# Patient Record
Sex: MALE | Race: WHITE | NOT HISPANIC OR LATINO | Employment: UNEMPLOYED | URBAN - METROPOLITAN AREA
[De-identification: names, ages, dates, MRNs, and addresses within clinical notes are randomized per-mention and may not be internally consistent; named-entity substitution may affect disease eponyms.]

---

## 2018-08-23 ENCOUNTER — OFFICE VISIT (OUTPATIENT)
Dept: URGENT CARE | Facility: CLINIC | Age: 48
End: 2018-08-23
Payer: COMMERCIAL

## 2018-08-23 VITALS
HEIGHT: 72 IN | BODY MASS INDEX: 26.68 KG/M2 | HEART RATE: 68 BPM | RESPIRATION RATE: 18 BRPM | OXYGEN SATURATION: 97 % | SYSTOLIC BLOOD PRESSURE: 170 MMHG | TEMPERATURE: 97 F | WEIGHT: 197 LBS | DIASTOLIC BLOOD PRESSURE: 100 MMHG

## 2018-08-23 DIAGNOSIS — M25.421 SWELLING OF RIGHT ELBOW: Primary | ICD-10-CM

## 2018-08-23 DIAGNOSIS — F17.200 TOBACCO DEPENDENCE: ICD-10-CM

## 2018-08-23 PROCEDURE — 99203 OFFICE O/P NEW LOW 30 MIN: CPT | Mod: 25,S$GLB,, | Performed by: FAMILY MEDICINE

## 2018-08-23 PROCEDURE — 96372 THER/PROPH/DIAG INJ SC/IM: CPT | Mod: S$GLB,,, | Performed by: FAMILY MEDICINE

## 2018-08-23 RX ORDER — DEXTROAMPHETAMINE SACCHARATE, AMPHETAMINE ASPARTATE MONOHYDRATE, DEXTROAMPHETAMINE SULFATE AND AMPHETAMINE SULFATE 7.5; 7.5; 7.5; 7.5 MG/1; MG/1; MG/1; MG/1
30 CAPSULE, EXTENDED RELEASE ORAL EVERY MORNING
COMMUNITY
End: 2019-05-01

## 2018-08-23 RX ORDER — BETAMETHASONE SODIUM PHOSPHATE AND BETAMETHASONE ACETATE 3; 3 MG/ML; MG/ML
6 INJECTION, SUSPENSION INTRA-ARTICULAR; INTRALESIONAL; INTRAMUSCULAR; SOFT TISSUE
Status: COMPLETED | OUTPATIENT
Start: 2018-08-23 | End: 2018-08-23

## 2018-08-23 RX ORDER — OMEPRAZOLE 40 MG/1
40 CAPSULE, DELAYED RELEASE ORAL DAILY
COMMUNITY
End: 2019-05-01

## 2018-08-23 RX ORDER — OXYCODONE HYDROCHLORIDE 15 MG/1
10 TABLET ORAL EVERY 4 HOURS PRN
COMMUNITY
End: 2019-05-01

## 2018-08-23 RX ORDER — BUPROPION HYDROCHLORIDE 300 MG/1
300 TABLET ORAL DAILY
COMMUNITY
End: 2019-05-01

## 2018-08-23 RX ORDER — METHYLPREDNISOLONE 4 MG/1
4 TABLET ORAL DAILY
Qty: 1 PACKAGE | Refills: 0 | Status: SHIPPED | OUTPATIENT
Start: 2018-08-23 | End: 2018-08-29

## 2018-08-23 RX ORDER — NAPROXEN 500 MG/1
500 TABLET ORAL 2 TIMES DAILY WITH MEALS
Qty: 14 TABLET | Refills: 0 | Status: SHIPPED | OUTPATIENT
Start: 2018-08-23 | End: 2018-08-30

## 2018-08-23 RX ADMIN — BETAMETHASONE SODIUM PHOSPHATE AND BETAMETHASONE ACETATE 6 MG: 3; 3 INJECTION, SUSPENSION INTRA-ARTICULAR; INTRALESIONAL; INTRAMUSCULAR; SOFT TISSUE at 01:08

## 2018-08-23 NOTE — PATIENT INSTRUCTIONS
PLEASE READ YOUR DISCHARGE INSTRUCTIONS ENTIRELY AS IT CONTAINS IMPORTANT INFORMATION.        naproxen for pain. You should take an over the counter antacid (zantac, Nexium, Prilosec) to protect your stomach. Eat with this medication.      You received a steroid shot today - this can elevate your blood pressure, elevate your blood sugar, water weight gain, nervous energy, redness to the face and dimpling of the skin where the shot goes in.   Do not use steroids more than 3 times per year.   If you have diabetes, please check you blood sugar frequently.  If you have high blood pressure, please check your blood pressure frequently.      We will call you once the blood test results    Please follow up with your primary care doctor for further treatment if your symptoms do not improve.      If your symptoms worsen in the mean time (numbness to the area, inability to use your extremity, severely worsening pain or swelling, fever, blood in your stool or dark brown stool) please go to the emergency room for further treatment.       Treating Gout Attacks     Raising the joint above the level of your heart can help reduce gout symptoms.     Gout is a disease that affects the joints. It is caused by excess uric acid in your blood stream that may lead to crystals forming in your joints. Left untreated, it can lead to painful foot and joint deformities and even kidney problems. But, by treating gout early, you can relieve pain and help prevent future problems. Gout can usually be treated with medication and proper diet. In severe cases, surgery may be needed.  Gout attacks are painful and often happen more than once. Taking medications may reduce pain and prevent attacks in the future. There are also some things you can do at home to relieve symptoms.  Medications for gout  Your healthcare provider may prescribe a daily medication to reduce levels of uric acid. Reducing your uric acid levels may help prevent gout attacks.  Allopurinol is one commonly used medication taken daily to reduce uric acid levels. Other medications can help relieve pain and swelling during an acute attack. Medicines such as NSAIDs (nonsteroidal anti-inflammatory medicines), steroids, and colchicine may be prescribed for intermittent use to relieve an acute gout attack. Be sure to take your medication as directed.  What you can do  Below are some things you can do at home to relieve gout symptoms. Your healthcare provider may have other tips.  · Rest the painful joint as much as you can.  · Raise the painful joint so it is at a level higher than your heart.  · Use ice for 10 minutes every 1-2 hours as possible.  How can I prevent gout?  With a little effort, you may be able to prevent gout attacks in the future. Here are some things you can do:  · Avoid foods high in purines  ¨ Certain meats (red meat, processed meat, turkey)  ¨ Organ meats (kidney, liver, sweetbread)  ¨ Shellfish (lobster, crab, shrimp, scallop, mussel)  ¨ Certain fish (anchovy, sardine, herring, mackerel)  · Take any medications prescribed by your healthcare provider.  · Lose weight if you need to.  · Reduce high fructose corn syrup in meals and drinks.  · Reduce or eliminate consumption of alcohol, particularly beer, but also red wine and spirits.  · Control blood pressure, diabetes, and cholesterol.  · Drink plenty of water to help flush uric acid from your body.  Date Last Reviewed: 2/1/2016  © 9037-3006 [employer] [address] [place] [address], [place] [address]. All rights reserved. This information is not intended as a substitute for professional medical care. Always follow your healthcare professional's instructions.        What Is Gout?  Gout is a disease that affects the joints. Left untreated, it can lead to painful foot and joint deformities and even kidney problems. But, by treating gout early, you can relieve pain and help prevent future problems. Gout can usually be  "treated with medication and proper diet. In severe cases, surgery may be needed.  What causes gout?  Gout is caused by an excess of uric acid (a waste product made by the body). Uric acid is excreted by the kidneys. If the uric acid level in your blood rises too high, the uric acid may form crystals that collect in the joints, bringing on a gout attack. If you have many gout attacks, crystals may form large deposits called tophi. Tophi can damage joints and cause deformity.  Who is at risk for gout?  Men are more likely to have gout than women. But women can also be affected, mostly after menopause. Some health problems, such as obesity and high cholesterol, make gout more likely. And some medications, such as diuretics (water pills), can trigger a gout attack. People who drink a lot of alcohol are at high risk for gout. Certain foods can also trigger a gout attack.  Substances that may trigger a gout attack  To help prevent a gout attack, avoid these foods:  · Alcohol (particularly beer, but also red wine and spirits)  · Certain meats (red meat, processed meat, turkey)  · Organ meats (kidney, liver, sweetbread)  · Shellfish (lobster, crab, shrimp, scallop, mussel)  · Certain fish (anchovy, sardine, herring, mackerel)   Treatment  · Lifestyle changes, including weight loss, exercise, and quitting tobacco use  · Reducing consumption of the food groups above as well as high fructose corn syrup, found in many foods including sodas and energy drinks  · Changing non-essential medications that may contribute to gout (such as thiazide diuretics--"water pills")  · Medications to reduce the amount of uric acid in the blood, such as allopurinol or others  · Medications to treat acute gout attacks, including NSAIDs (nonsteroidal anti-inflammatory medicines), steroids, and colchicine  Date Last Reviewed: 2/1/2016  © 7076-8934 [employer]. [address] [place] [address], [place] [address]. All rights reserved. This " information is not intended as a substitute for professional medical care. Always follow your healthcare professional's instructions.

## 2018-08-24 ENCOUNTER — TELEPHONE (OUTPATIENT)
Dept: URGENT CARE | Facility: CLINIC | Age: 48
End: 2018-08-24

## 2018-08-24 LAB — URATE SERPL-MCNC: 4.6 MG/DL (ref 3.7–8.6)

## 2018-08-25 NOTE — TELEPHONE ENCOUNTER
----- Message from Mitzy Linda MD sent at 8/24/2018  9:19 AM CDT -----  Notify patient that all lab results are normal.No evidence of gout. Followup with primary care doctor as needed

## 2018-08-28 ENCOUNTER — TELEPHONE (OUTPATIENT)
Dept: URGENT CARE | Facility: CLINIC | Age: 48
End: 2018-08-28

## 2018-08-28 NOTE — TELEPHONE ENCOUNTER
delivered lab results to pt, he is going to follow up with primary doctor as instructed.     ----- Message from Mitzy Linda MD sent at 8/24/2018  9:19 AM CDT -----  Notify patient that all lab results are normal.No evidence of gout. Followup with primary care doctor as needed

## 2019-03-13 ENCOUNTER — OFFICE VISIT (OUTPATIENT)
Dept: URGENT CARE | Facility: CLINIC | Age: 49
End: 2019-03-13
Payer: COMMERCIAL

## 2019-03-13 VITALS
DIASTOLIC BLOOD PRESSURE: 100 MMHG | HEART RATE: 100 BPM | OXYGEN SATURATION: 96 % | SYSTOLIC BLOOD PRESSURE: 160 MMHG | BODY MASS INDEX: 26.82 KG/M2 | TEMPERATURE: 98 F | RESPIRATION RATE: 18 BRPM | WEIGHT: 198 LBS | HEIGHT: 72 IN

## 2019-03-13 DIAGNOSIS — J40 BRONCHITIS: Primary | ICD-10-CM

## 2019-03-13 PROCEDURE — 99214 OFFICE O/P EST MOD 30 MIN: CPT | Mod: S$GLB,,, | Performed by: INTERNAL MEDICINE

## 2019-03-13 PROCEDURE — 99214 PR OFFICE/OUTPT VISIT, EST, LEVL IV, 30-39 MIN: ICD-10-PCS | Mod: S$GLB,,, | Performed by: INTERNAL MEDICINE

## 2019-03-13 RX ORDER — BENZONATATE 100 MG/1
100 CAPSULE ORAL 2 TIMES DAILY PRN
Qty: 14 CAPSULE | Refills: 0 | Status: SHIPPED | OUTPATIENT
Start: 2019-03-13 | End: 2019-03-20

## 2019-03-13 RX ORDER — AMOXICILLIN 500 MG/1
500 CAPSULE ORAL 3 TIMES DAILY
Qty: 21 CAPSULE | Refills: 0 | Status: SHIPPED | OUTPATIENT
Start: 2019-03-13 | End: 2019-03-20

## 2019-03-13 NOTE — PATIENT INSTRUCTIONS
If your condition worsens we recommend that you receive another evaluation at the emergency room immediately or contact your primary medical clinics after hours call service to discuss your concerns. You must understand that you've received an Urgent Care treatment only and that you may be released before all of your medical problems are known or treated. You, the patient, will arrange for follow up care as instructed.  Drink plenty of Fluids  Wash hands frequently using mild antibacterial soap lathering for at least 15 seconds then rinse  Get plenty of Rest  Salt water gargles  Follow up in 1-2 weeks with Primary Care physician if not significantly better.   If you are not allergic please Tylenol every 4-6 hours as needed and/or Ibuprofen every 6-8 hours as needed, over the counter for pain or fever.  Take OTC Cough/Congestion medicine as needed. Talk to your pharmacist about the best option for you.  High Blood Pressure, To Be Confirmed, No Treatment  Your blood pressure today was higher than normal. Sometimes anxiety or pain can cause a temporary rise in blood pressure. It later returns to normal. Blood pressure that is high only one time doesnt mean that you have high blood pressure (hypertension). High blood pressure is a chronic illness. But you should have your blood pressure measured again within the next few days to find out if its still high.    A blood pressure reading is made up of two numbers: a higher number over a lower number. The top number is the systolic pressure. The bottom number is the diastolic pressure. A normal blood pressure is a systolic pressure of less than 120 over a diastolic pressure of less than 80. You will see your blood pressure readings written together. For example, a person with a systolic pressure of 118 and a diastolic pressure of 78 will have 118/78 written in the medical record.     High blood pressure is when either the top number is 140 or higher, or the bottom number  is 90 or higher. This must be the result when taking your blood pressure a number of times.   The blood pressures between normal and high are called prehypertension. This is systolic pressure of 120 to 140 or diastolic pressure of 80 to 89. Prehypertension means you are at risk of getting high blood pressure. It's a warning sign. The information gives you a chance to  make lifestyle changes such as weight loss, exercise, and quitting smoking, that can keep your blood pressure from going higher. You should have your blood pressure checked regularly to be sure it isnt rising.  Home care  To track your blood pressure, your provider may ask you to come into the office at different times and on different days. If your healthcare provider asks you to check your readings at home, ask him or her what times of the day to test and for how many days. Before you leave the office, ask your provider to show you how to take your blood pressure and be sure to ask questions if you don't understand something.  Consider buying an automatic blood pressure monitor. Ask your provider for a recommendation. You can buy blood pressure monitors at most pharmacies.  The American Heart Association recommends the following guidelines for home blood pressure monitoring:  · Don't smoke or drink coffee for 30 minutes before taking your blood pressure.  · Go to the bathroom before the test.  · Relax for 5 minutes before taking the measurement.  · Sit with your back supported (don't sit on a couch or soft chair); keep your feet on the floor uncrossed. Place your arm on a solid flat surface (like a table) with the upper part of the arm at heart level. Place the middle of the cuff directly above the eye of the elbow. Check the monitor's instruction manual for an illustration.  · Take multiple readings. When you measure, take 2 to 3 readings one minute apart and record all of the results.  · Take your blood pressure at the same time every day, or as  your healthcare provider recommends.  · Record the date, time, and blood pressure reading.  · Take the record with you to your next medical appointment. If your blood pressure monitor has a built-in memory, simply take the monitor with you to your next appointment.  · Call your provider if you have several high readings. Don't be frightened by a single high blood pressure reading, but if you get several high readings, check in with your healthcare provider.  · Note: When blood pressure reaches a systolic (top number) of 180 or higher OR diastolic (bottom number) of 110 or higher, seek emergency medical treatment.  Follow-up care  Keep all of your follow up appointments. If your blood pressure is high (more than 120 over 80) on 2 out of 3 days, you will need to follow up with your healthcare provider for more evaluation and treatment.  Dont put this off! High blood pressure can be treated. High blood pressure thats not treated raises your risk for heart attack and stroke.  When to seek medical advice  Call your healthcare provider right away if any of these occur:  · Blood pressure reaches a systolic (top number) of 180 or higher, OR diastolic (bottom number) of 110 or higher  · Chest pain or shortness of breath  · Severe headache  · Throbbing or rushing sound in the ears  · Nosebleed  · Sudden severe pain in your belly (abdomen)  · Extreme drowsiness, confusion, or fainting  · Dizziness or dizziness with spinning sensation (vertigo)  · Weakness of an arm or leg or one side of the face  · You have problems speaking or seeing   Date Last Reviewed: 12/1/2016  © 5127-3651 [employer]. [address] [place] [address], [place] [address]. All rights reserved. This information is not intended as a substitute for professional medical care. Always follow your healthcare professional's instructions.

## 2019-03-13 NOTE — PROGRESS NOTES
Subjective:       Patient ID: Leonid Sidhu is a 49 y.o. male.    Vitals:  height is 6' (1.829 m) and weight is 89.8 kg (198 lb). His oral temperature is 98.2 °F (36.8 °C). His blood pressure is 160/100 (abnormal) and his pulse is 100. His respiration is 18 and oxygen saturation is 96%.     Chief Complaint: Cough    Pt states he can not hear with his left ear but both ears are clogged up, left eye was swollen for two days, productive cough (yellow-green mucus) for about a week, nasal congestion for about a week. Pt states he was in [place] for three weeks and probably the weather change is affecting him, he came back four days ago.        Cough   This is a new problem. The current episode started in the past 7 days. The problem has been gradually worsening. The problem occurs constantly. The cough is productive of sputum. Associated symptoms include ear congestion, headaches and a sore throat. Pertinent negatives include no chills, ear pain, eye redness, fever, hemoptysis, myalgias, rash, shortness of breath or wheezing. Nothing aggravates the symptoms. Treatments tried: dayquil, nyquil, tylenol. The treatment provided no relief.       Constitution: Positive for sweating. Negative for chills, fatigue and fever.   HENT: Positive for congestion and sore throat. Negative for ear pain, sinus pain, sinus pressure and voice change.    Neck: Negative for painful lymph nodes.   Eyes: Negative for eye redness.   Respiratory: Positive for cough and sputum production. Negative for chest tightness, bloody sputum, COPD, shortness of breath, stridor, wheezing and asthma.    Gastrointestinal: Negative for nausea and vomiting.   Musculoskeletal: Negative for muscle ache.   Skin: Negative for rash.   Allergic/Immunologic: Negative for seasonal allergies and asthma.   Neurological: Positive for headaches.   Hematologic/Lymphatic: Negative for swollen lymph nodes.       Objective:      Physical Exam   Constitutional: He is oriented  to person, place, and time. He appears well-developed and well-nourished. He is cooperative.  Non-toxic appearance. He does not appear ill. No distress.   HENT:   Head: Normocephalic and atraumatic.   Right Ear: Hearing, tympanic membrane, external ear and ear canal normal.   Left Ear: Hearing, tympanic membrane, external ear and ear canal normal.   Nose: Rhinorrhea present. No mucosal edema or nasal deformity. No epistaxis. Right sinus exhibits no maxillary sinus tenderness and no frontal sinus tenderness. Left sinus exhibits no maxillary sinus tenderness and no frontal sinus tenderness.   Mouth/Throat: Uvula is midline and mucous membranes are normal. No trismus in the jaw. Normal dentition. No uvula swelling. Oropharyngeal exudate present. No posterior oropharyngeal erythema.   Eyes: Conjunctivae and lids are normal. Right eye exhibits no discharge. Left eye exhibits no discharge. No scleral icterus.   Sclera clear bilat   Neck: Trachea normal, normal range of motion, full passive range of motion without pain and phonation normal. Neck supple.   Cardiovascular: Normal rate, regular rhythm, normal heart sounds, intact distal pulses and normal pulses.   Pulmonary/Chest: Effort normal and breath sounds normal. No respiratory distress.   Abdominal: Soft. Normal appearance and bowel sounds are normal. He exhibits no distension, no pulsatile midline mass and no mass. There is no tenderness.   Musculoskeletal: Normal range of motion. He exhibits no edema or deformity.   Neurological: He is alert and oriented to person, place, and time. He exhibits normal muscle tone. Coordination normal.   Skin: Skin is warm, dry and intact. He is not diaphoretic. No pallor.   Psychiatric: He has a normal mood and affect. His speech is normal and behavior is normal. Judgment and thought content normal. Cognition and memory are normal.   Nursing note and vitals reviewed.      Assessment:       1. Bronchitis        Plan:          Bronchitis  -     amoxicillin (AMOXIL) 500 MG capsule; Take 1 capsule (500 mg total) by mouth 3 (three) times daily. for 7 days  Dispense: 21 capsule; Refill: 0  -     benzonatate (TESSALON PERLES) 100 MG capsule; Take 1 capsule (100 mg total) by mouth 2 (two) times daily as needed for Cough.  Dispense: 14 capsule; Refill: 0    If your condition worsens we recommend that you receive another evaluation at the emergency room immediately or contact your primary medical clinics after hours call service to discuss your concerns. You must understand that you've received an Urgent Care treatment only and that you may be released before all of your medical problems are known or treated. You, the patient, will arrange for follow up care as instructed.  Drink plenty of Fluids  Wash hands frequently using mild antibacterial soap lathering for at least 15 seconds then rinse  Get plenty of Rest  Salt water gargles  Follow up in 1-2 weeks with Primary Care physician if not significantly better.   If you are not allergic please Tylenol every 4-6 hours as needed and/or Ibuprofen every 6-8 hours as needed, over the counter for pain or fever.  Take OTC Cough/Congestion medicine as needed. Talk to your pharmacist about the best option for you.

## 2019-03-16 ENCOUNTER — TELEPHONE (OUTPATIENT)
Dept: URGENT CARE | Facility: CLINIC | Age: 49
End: 2019-03-16

## 2019-05-01 ENCOUNTER — OFFICE VISIT (OUTPATIENT)
Dept: FAMILY MEDICINE | Facility: CLINIC | Age: 49
End: 2019-05-01
Payer: COMMERCIAL

## 2019-05-01 DIAGNOSIS — Z23 IMMUNIZATION DUE: ICD-10-CM

## 2019-05-01 DIAGNOSIS — K21.9 GASTROESOPHAGEAL REFLUX DISEASE, ESOPHAGITIS PRESENCE NOT SPECIFIED: ICD-10-CM

## 2019-05-01 DIAGNOSIS — Z00.00 LABORATORY EXAM ORDERED AS PART OF ROUTINE GENERAL MEDICAL EXAMINATION: ICD-10-CM

## 2019-05-01 DIAGNOSIS — E29.1 TESTICULAR HYPOFUNCTION: ICD-10-CM

## 2019-05-01 DIAGNOSIS — Z12.5 PROSTATE CANCER SCREENING: ICD-10-CM

## 2019-05-01 DIAGNOSIS — G47.00 INSOMNIA, UNSPECIFIED TYPE: ICD-10-CM

## 2019-05-01 DIAGNOSIS — Z00.00 ROUTINE PHYSICAL EXAMINATION: Primary | ICD-10-CM

## 2019-05-01 DIAGNOSIS — F11.21 OPIOID DEPENDENCE IN REMISSION: ICD-10-CM

## 2019-05-01 DIAGNOSIS — F41.9 ANXIETY: ICD-10-CM

## 2019-05-01 LAB
ALBUMIN SERPL BCP-MCNC: 3.8 G/DL (ref 3.5–5.2)
ALP SERPL-CCNC: 92 U/L (ref 55–135)
ALT SERPL W/O P-5'-P-CCNC: 20 U/L (ref 10–44)
ANION GAP SERPL CALC-SCNC: 9 MMOL/L (ref 8–16)
AST SERPL-CCNC: 22 U/L (ref 10–40)
BASOPHILS # BLD AUTO: 0.03 K/UL (ref 0–0.2)
BASOPHILS NFR BLD: 0.4 % (ref 0–1.9)
BILIRUB SERPL-MCNC: 0.2 MG/DL (ref 0.1–1)
BUN SERPL-MCNC: 22 MG/DL (ref 6–20)
CALCIUM SERPL-MCNC: 9.4 MG/DL (ref 8.7–10.5)
CHLORIDE SERPL-SCNC: 106 MMOL/L (ref 95–110)
CHOLEST SERPL-MCNC: 204 MG/DL (ref 120–199)
CHOLEST/HDLC SERPL: 3.1 {RATIO} (ref 2–5)
CO2 SERPL-SCNC: 26 MMOL/L (ref 23–29)
CREAT SERPL-MCNC: 0.8 MG/DL (ref 0.5–1.4)
DIFFERENTIAL METHOD: ABNORMAL
EOSINOPHIL # BLD AUTO: 0.3 K/UL (ref 0–0.5)
EOSINOPHIL NFR BLD: 3.5 % (ref 0–8)
ERYTHROCYTE [DISTWIDTH] IN BLOOD BY AUTOMATED COUNT: 12.5 % (ref 11.5–14.5)
EST. GFR  (AFRICAN AMERICAN): >60 ML/MIN/1.73 M^2
EST. GFR  (NON AFRICAN AMERICAN): >60 ML/MIN/1.73 M^2
GLUCOSE SERPL-MCNC: 96 MG/DL (ref 70–110)
HCT VFR BLD AUTO: 41.9 % (ref 40–54)
HDLC SERPL-MCNC: 65 MG/DL (ref 40–75)
HDLC SERPL: 31.9 % (ref 20–50)
HGB BLD-MCNC: 13.8 G/DL (ref 14–18)
IMM GRANULOCYTES # BLD AUTO: 0.04 K/UL (ref 0–0.04)
IMM GRANULOCYTES NFR BLD AUTO: 0.6 % (ref 0–0.5)
LDLC SERPL CALC-MCNC: 103.6 MG/DL (ref 63–159)
LYMPHOCYTES # BLD AUTO: 2.5 K/UL (ref 1–4.8)
LYMPHOCYTES NFR BLD: 34.9 % (ref 18–48)
MCH RBC QN AUTO: 31.1 PG (ref 27–31)
MCHC RBC AUTO-ENTMCNC: 32.9 G/DL (ref 32–36)
MCV RBC AUTO: 94 FL (ref 82–98)
MONOCYTES # BLD AUTO: 0.7 K/UL (ref 0.3–1)
MONOCYTES NFR BLD: 9.6 % (ref 4–15)
NEUTROPHILS # BLD AUTO: 3.6 K/UL (ref 1.8–7.7)
NEUTROPHILS NFR BLD: 51 % (ref 38–73)
NONHDLC SERPL-MCNC: 139 MG/DL
NRBC BLD-RTO: 0 /100 WBC
PLATELET # BLD AUTO: 285 K/UL (ref 150–350)
PMV BLD AUTO: 9.5 FL (ref 9.2–12.9)
POTASSIUM SERPL-SCNC: 3.9 MMOL/L (ref 3.5–5.1)
PROT SERPL-MCNC: 7 G/DL (ref 6–8.4)
RBC # BLD AUTO: 4.44 M/UL (ref 4.6–6.2)
SODIUM SERPL-SCNC: 141 MMOL/L (ref 136–145)
TRIGL SERPL-MCNC: 177 MG/DL (ref 30–150)
TSH SERPL DL<=0.005 MIU/L-ACNC: 2.82 UIU/ML (ref 0.4–4)
WBC # BLD AUTO: 7.08 K/UL (ref 3.9–12.7)

## 2019-05-01 PROCEDURE — 85025 COMPLETE CBC W/AUTO DIFF WBC: CPT

## 2019-05-01 PROCEDURE — 90715 TDAP VACCINE GREATER THAN OR EQUAL TO 7YO IM: ICD-10-PCS | Mod: S$GLB,,, | Performed by: NURSE PRACTITIONER

## 2019-05-01 PROCEDURE — 90471 TDAP VACCINE GREATER THAN OR EQUAL TO 7YO IM: ICD-10-PCS | Mod: S$GLB,,, | Performed by: NURSE PRACTITIONER

## 2019-05-01 PROCEDURE — 90715 TDAP VACCINE 7 YRS/> IM: CPT | Mod: S$GLB,,, | Performed by: NURSE PRACTITIONER

## 2019-05-01 PROCEDURE — 82607 VITAMIN B-12: CPT

## 2019-05-01 PROCEDURE — 83036 HEMOGLOBIN GLYCOSYLATED A1C: CPT

## 2019-05-01 PROCEDURE — 84403 ASSAY OF TOTAL TESTOSTERONE: CPT

## 2019-05-01 PROCEDURE — 80061 LIPID PANEL: CPT

## 2019-05-01 PROCEDURE — 82306 VITAMIN D 25 HYDROXY: CPT

## 2019-05-01 PROCEDURE — 80053 COMPREHEN METABOLIC PANEL: CPT

## 2019-05-01 PROCEDURE — 84443 ASSAY THYROID STIM HORMONE: CPT

## 2019-05-01 PROCEDURE — 90471 IMMUNIZATION ADMIN: CPT | Mod: S$GLB,,, | Performed by: NURSE PRACTITIONER

## 2019-05-01 PROCEDURE — 84153 ASSAY OF PSA TOTAL: CPT

## 2019-05-01 PROCEDURE — 99386 PR PREVENTIVE VISIT,NEW,40-64: ICD-10-PCS | Mod: 25,S$GLB,, | Performed by: NURSE PRACTITIONER

## 2019-05-01 PROCEDURE — 84402 ASSAY OF FREE TESTOSTERONE: CPT

## 2019-05-01 PROCEDURE — 99386 PREV VISIT NEW AGE 40-64: CPT | Mod: 25,S$GLB,, | Performed by: NURSE PRACTITIONER

## 2019-05-01 RX ORDER — BUPROPION HYDROCHLORIDE 300 MG/1
300 TABLET ORAL DAILY
Qty: 90 TABLET | Refills: 3 | Status: SHIPPED | OUTPATIENT
Start: 2019-05-01 | End: 2021-05-11 | Stop reason: CLARIF

## 2019-05-01 RX ORDER — TRAZODONE HYDROCHLORIDE 100 MG/1
100 TABLET ORAL NIGHTLY
Qty: 90 TABLET | Refills: 3 | Status: SHIPPED | OUTPATIENT
Start: 2019-05-01 | End: 2023-09-20

## 2019-05-01 RX ORDER — OLOPATADINE HYDROCHLORIDE 1 MG/ML
1 SOLUTION/ DROPS OPHTHALMIC 2 TIMES DAILY
Qty: 5 ML | Refills: 1 | Status: SHIPPED | OUTPATIENT
Start: 2019-05-01 | End: 2019-09-13

## 2019-05-01 RX ORDER — OMEPRAZOLE 40 MG/1
40 CAPSULE, DELAYED RELEASE ORAL DAILY
Qty: 90 CAPSULE | Refills: 3 | Status: SHIPPED | OUTPATIENT
Start: 2019-05-01 | End: 2023-09-20

## 2019-05-01 NOTE — PROGRESS NOTES
Venipuncture performed with 21 gauge butterfly, x's 2 attempt,  to L Antecubital vein.  Specimens collected per orders.      Pressure dressing applied to site, instructed patient to remove dressing in 10-15 minutes, OK to re-adjust dressing if pressure causing any discomfort, to observe closely for numbness and/or discoloration to hand or fingers, and to notify provider if bleeding persists after applying constant pressure lasting 30 minutes.

## 2019-05-02 LAB
25(OH)D3+25(OH)D2 SERPL-MCNC: 14 NG/ML (ref 30–96)
COMPLEXED PSA SERPL-MCNC: 0.19 NG/ML (ref 0–4)
ESTIMATED AVG GLUCOSE: 100 MG/DL (ref 68–131)
HBA1C MFR BLD HPLC: 5.1 % (ref 4–5.6)
TESTOST SERPL-MCNC: 203 NG/DL (ref 304–1227)
VIT B12 SERPL-MCNC: 269 PG/ML (ref 210–950)

## 2019-05-03 VITALS
SYSTOLIC BLOOD PRESSURE: 138 MMHG | BODY MASS INDEX: 27.82 KG/M2 | WEIGHT: 205.38 LBS | RESPIRATION RATE: 18 BRPM | TEMPERATURE: 98 F | HEIGHT: 72 IN | OXYGEN SATURATION: 97 % | HEART RATE: 96 BPM | DIASTOLIC BLOOD PRESSURE: 86 MMHG

## 2019-05-03 NOTE — PROGRESS NOTES
Subjective:       Patient ID: Leonid Sidhu is a 49 y.o. male.    Chief Complaint: Establish Care    The patient is here to establish care.  He has a long history of chronic back pain and tells me that he became hooked on pain medications.  He recently tried to get off and was put on Suboxone.  He does tell me that he eventually weaned himself off of the Suboxone and is now clean.  He is interested in staying clean but when he quit his Suboxone he quit is Wellbutrin as well that he took for anxiety.  He would like to resume his Wellbutrin as well as his trazodone as he does still have problems sleeping.  He has not had a regular checkup in quite some time.  He does tell me he just recently got out of [facility].    He does have a history of GERD as well and would like a refill on his Prilosec.      He does have a long history of ADHD as well.  We did have a long discussion about not using stimulants secondary to their addiction potential.    Review of Systems   Constitutional: Negative for activity change and appetite change.   HENT: Negative for congestion, postnasal drip, rhinorrhea and sinus pressure.    Eyes: Negative for pain and redness.   Respiratory: Negative for choking and chest tightness.    Gastrointestinal: Negative for abdominal distention, abdominal pain, blood in stool, constipation, diarrhea, nausea and vomiting.   Endocrine: Negative for polydipsia and polyphagia.   Genitourinary: Negative for dysuria and hematuria.   Musculoskeletal: Positive for back pain. Negative for arthralgias and myalgias.   Skin: Negative for color change and rash.   Neurological: Negative for dizziness and headaches.   Psychiatric/Behavioral: Negative for agitation and behavioral problems.       Past medical, surgical, family and social history reviewed.  Objective:     Vitals:    05/01/19 1011   BP: 138/86   Pulse: 96   Resp: 18   Temp: 98.3 °F (36.8 °C)   TempSrc: Oral   SpO2: 97%   Weight: 93.2 kg (205  lb 5.7 oz)   Height: 6' (1.829 m)   PainSc: 0-No pain     Body mass index is 27.85 kg/m².     Physical Exam   Constitutional: He is oriented to person, place, and time. He appears well-developed and well-nourished. No distress.   HENT:   Head: Normocephalic and atraumatic.   Right Ear: Hearing, tympanic membrane, external ear and ear canal normal.   Left Ear: Hearing, tympanic membrane, external ear and ear canal normal.   Nose: Nose normal.   Mouth/Throat: Uvula is midline, oropharynx is clear and moist and mucous membranes are normal.   Eyes: Pupils are equal, round, and reactive to light. Conjunctivae and EOM are normal. Right eye exhibits no discharge. Left eye exhibits no discharge.   Neck: Trachea normal and normal range of motion. Neck supple. No JVD present. Carotid bruit is not present. No thyromegaly present.   Cardiovascular: Normal rate and regular rhythm. Exam reveals no gallop and no friction rub.   No murmur heard.  Pulmonary/Chest: Effort normal and breath sounds normal. No respiratory distress. He has no wheezes. He has no rales. He exhibits no tenderness.   Abdominal: Soft. Bowel sounds are normal. He exhibits no distension and no mass. There is no tenderness. There is no rebound and no guarding.   Musculoskeletal: Normal range of motion.   Neurological: He is alert and oriented to person, place, and time. Coordination normal.   Skin: Skin is warm and dry. He is not diaphoretic.   Psychiatric: He has a normal mood and affect. His behavior is normal. Judgment and thought content normal.       Assessment:       1. Routine physical examination    2. Laboratory exam ordered as part of routine general medical examination    3. Testicular hypofunction    4. Prostate cancer screening    5. Immunization due    6. Opioid dependence in remission    7. Anxiety    8. Insomnia, unspecified type    9. Gastroesophageal reflux disease, esophagitis presence not specified        Plan:       Leonid FARRIS was seen today  for establish care.    Diagnoses and all orders for this visit:    Routine physical examination    Laboratory exam ordered as part of routine general medical examination  -     CBC auto differential  -     Comprehensive metabolic panel  -     Hemoglobin A1c  -     Lipid panel  -     TSH  -     Vitamin B12  -     Vitamin D    Testicular hypofunction  -     Testosterone  -     Testosterone, free    Prostate cancer screening  -     PSA, Screening    Immunization due  -     (In Office Administered) Tdap Vaccine    Opioid dependence in remission  Continue therapy.    Anxiety  -     buPROPion (WELLBUTRIN XL) 300 MG 24 hr tablet; Take 1 tablet (300 mg total) by mouth once daily.    Insomnia, unspecified type  -     traZODone (DESYREL) 100 MG tablet; Take 1 tablet (100 mg total) by mouth every evening.    GERD  -     omeprazole (PRILOSEC) 40 MG capsule; Take 1 capsule (40 mg total) by mouth once daily.  -     olopatadine (PATANOL) 0.1 % ophthalmic solution; Place 1 drop into both eyes 2 (two) times daily.

## 2019-05-06 ENCOUNTER — TELEPHONE (OUTPATIENT)
Dept: FAMILY MEDICINE | Facility: CLINIC | Age: 49
End: 2019-05-06

## 2019-05-06 DIAGNOSIS — E29.1 TESTICULAR HYPOFUNCTION: Primary | ICD-10-CM

## 2019-05-06 RX ORDER — ERGOCALCIFEROL 1.25 MG/1
50000 CAPSULE ORAL
Qty: 12 CAPSULE | Refills: 0 | Status: SHIPPED | OUTPATIENT
Start: 2019-05-06 | End: 2019-09-13

## 2019-05-06 NOTE — LETTER
[place] [address] [address]  [facility] [address]  Phone: [phone]  Fax: [phone] May 10, 2019     Leonid Sidhu  [address] [place] [id number]      Dear Leonid FARRIS:    We have tried to reach you multiple times by phone but have been unsuccessful in speaking with you. Your health and follow-up medical care are important to us. Please call our office as soon as possible so that we may review your recent results. If you have already contacted our office, please disregard this letter.    Sincerely,        Rae Donovan NP

## 2019-05-07 LAB — TESTOST FREE SERPL-MCNC: 7.5 PG/ML (ref 5.1–41.5)

## 2019-05-07 NOTE — TELEPHONE ENCOUNTER
Labs overall were good.  His testosterone level was low so I would like to refer him to Dr Man  as we discussed in clinic.  His vitamin-D was very low and which I sent in prescription vitamin-D.  I can review all of his labs at our follow-up visit.

## 2019-05-09 NOTE — TELEPHONE ENCOUNTER
Third attempt, left message for return call . Inactive My Chart, patient has office visit on 6/5/19. How would you like to proceed?

## 2019-05-22 ENCOUNTER — PATIENT OUTREACH (OUTPATIENT)
Dept: ADMINISTRATIVE | Facility: HOSPITAL | Age: 49
End: 2019-05-22

## 2019-05-22 NOTE — PROGRESS NOTES
There are no preventive care reminders to display for this patient.  Chart review complete/scrubbed 05/22/2019  Future Appointments   Date Time Provider Department Center   6/5/2019 10:40 AM Rae Donovan NP [facility]       
[phone]

## 2019-06-25 ENCOUNTER — TELEPHONE (OUTPATIENT)
Dept: FAMILY MEDICINE | Facility: CLINIC | Age: 49
End: 2019-06-25

## 2019-06-25 RX ORDER — CLINDAMYCIN HYDROCHLORIDE 300 MG/1
300 CAPSULE ORAL EVERY 8 HOURS
Qty: 30 CAPSULE | Refills: 0 | Status: SHIPPED | OUTPATIENT
Start: 2019-06-25 | End: 2019-09-13

## 2022-08-31 PROBLEM — L03.113 CELLULITIS OF RIGHT UPPER EXTREMITY: Status: ACTIVE | Noted: 2022-08-31

## 2022-08-31 PROBLEM — M79.643 PAIN, HAND: Status: ACTIVE | Noted: 2022-08-31

## 2023-08-30 PROBLEM — R07.9 CHEST PAIN: Status: ACTIVE | Noted: 2023-08-30

## 2023-08-30 PROBLEM — I50.9 NEW ONSET OF CONGESTIVE HEART FAILURE: Status: ACTIVE | Noted: 2023-08-30

## 2023-08-30 PROBLEM — R79.89 ELEVATED TROPONIN: Status: ACTIVE | Noted: 2023-08-30

## 2023-09-01 PROBLEM — R79.89 ELEVATED TROPONIN: Status: RESOLVED | Noted: 2023-08-30 | Resolved: 2023-09-01

## 2023-09-01 PROBLEM — R07.9 CHEST PAIN: Status: RESOLVED | Noted: 2023-08-30 | Resolved: 2023-09-01

## 2023-09-05 ENCOUNTER — TELEPHONE (OUTPATIENT)
Dept: FAMILY MEDICINE | Facility: CLINIC | Age: 53
End: 2023-09-05
Payer: MEDICAID

## 2023-09-05 NOTE — TELEPHONE ENCOUNTER
----- Message from Camelia Pierce MA sent at 9/5/2023  2:56 PM CDT -----  Regarding: Referral needed  Patient is calling to get a referral to Cardiology after ER visit.        GUDELIA DANIELLE (Mother)   [phone] (Mobile)

## 2023-09-06 ENCOUNTER — TELEPHONE (OUTPATIENT)
Dept: FAMILY MEDICINE | Facility: CLINIC | Age: 53
End: 2023-09-06
Payer: MEDICAID

## 2023-09-06 NOTE — TELEPHONE ENCOUNTER
LVM with clinic call back number. Messaged [facility] to assist with pt with establishing care closer to home.

## 2023-09-10 ENCOUNTER — HOSPITAL ENCOUNTER (EMERGENCY)
Facility: HOSPITAL | Age: 53
Discharge: HOME | End: 2023-09-10
Attending: EMERGENCY MEDICINE
Payer: MEDICAID

## 2023-09-10 VITALS
OXYGEN SATURATION: 99 % | WEIGHT: 160 LBS | HEART RATE: 103 BPM | RESPIRATION RATE: 18 BRPM | SYSTOLIC BLOOD PRESSURE: 132 MMHG | DIASTOLIC BLOOD PRESSURE: 86 MMHG | TEMPERATURE: 98 F | BODY MASS INDEX: 22.32 KG/M2

## 2023-09-10 DIAGNOSIS — R60.0 BILATERAL LEG EDEMA: Primary | ICD-10-CM

## 2023-09-10 DIAGNOSIS — I50.9 CONGESTIVE HEART FAILURE, UNSPECIFIED HF CHRONICITY, UNSPECIFIED HEART FAILURE TYPE: ICD-10-CM

## 2023-09-10 PROCEDURE — 99283 EMERGENCY DEPT VISIT LOW MDM: CPT

## 2023-09-10 RX ORDER — FUROSEMIDE 20 MG/1
20 TABLET ORAL DAILY
Qty: 30 TABLET | Refills: 1 | Status: SHIPPED | OUTPATIENT
Start: 2023-09-10 | End: 2023-09-20 | Stop reason: SDUPTHER

## 2023-09-10 NOTE — DISCHARGE INSTRUCTIONS
Try to schedule an appointment with the [facility] first.  If you are unable to be seen, contact [facility] cardiology for an appointment.     Return to the ER immediately if you have worsening leg swelling despite using your diuretics, develop chest pain or shortness of breath, fainting or any other worrisome symptoms.

## 2023-09-10 NOTE — ED TRIAGE NOTES
Leonid Sidhu, a 53 y.o. male presents to the ED w/ complaint of bilateral ankle swelling. Pt was admitted to [facility] last week where he spent 2 days hospitalized for CHF, pt states he needs a cardiologist but needs to find a PCP first in order to obtain cardiology referral, but he hasn't been able to. Not on antidiuretics at this time, denies SOB or chest pain.     Triage note:  Chief Complaint   Patient presents with    Leg Swelling     Recent Dx CHF, discharged from [place] 1 week ago, told EF of 20%, never received dieretics (Lasix). Denies chest pain or SOB, very talkative in triage        Review of patient's allergies indicates:   Allergen Reactions    No known drug allergies      Past Medical History:   Diagnosis Date    ADHD (attention deficit hyperactivity disorder)     Anxiety     Cervical spondylosis     Degeneration of lumbar or lumbosacral intervertebral disc     L3-S1    Depression

## 2023-09-10 NOTE — ED PROVIDER NOTES
Encounter Date: 9/10/2023       History     Chief Complaint   Patient presents with    Leg Swelling     Recent Dx CHF, discharged from [place] 1 week ago, told EF of 20%, never received dieretics (Lasix). Denies chest pain or SOB, very talkative in triage        53-year-old male with recently diagnosed heart failure (echo 23 EF 15-20%) presents to the ED with various concerns.  Patient's main concern is that he has had leg and ankle swelling over the past few days.  He was discharged on  and states that he did not receive his Lasix prescription.  Per chart, patient was discharged with ASA 81mg, Entresto and lasix.  All prescriptions were received by pharmacy.  However, patient states that he was not given the Lasix prescription at the pharmacy.  He denies chest pain, shortness of breath.  He expresses concern about his new diagnosis.  He has also had difficulty obtaining cardiology referral.  He states that he was told to establish with a PCP in order to obtain the cardiology referral.       Review of patient's allergies indicates:   Allergen Reactions    No known drug allergies      Past Medical History:   Diagnosis Date    ADHD (attention deficit hyperactivity disorder)     Anxiety     Cervical spondylosis     Degeneration of lumbar or lumbosacral intervertebral disc     L3-S1    Depression      Past Surgical History:   Procedure Laterality Date    KNEE ARTHROSCOPY W/ DEBRIDEMENT       Family History   Problem Relation Age of Onset    Brain cancer Father     Cancer Father      Social History     Tobacco Use    Smoking status: Former     Current packs/day: 0.00     Types: Cigarettes     Quit date: 2019     Years since quittin.6    Smokeless tobacco: Never   Substance Use Topics    Alcohol use: No     Review of Systems   Respiratory:  Negative for shortness of breath.    Cardiovascular:  Positive for leg swelling. Negative for chest pain.       Physical Exam     Initial Vitals [09/10/23 0839]   BP  Pulse Resp Temp SpO2   132/86 (!) 114 18 98 °F (36.7 °C) 99 %      MAP       --         Physical Exam    Nursing note and vitals reviewed.  Constitutional: He appears well-developed and well-nourished.  Non-toxic appearance. He does not appear ill. No distress.   HENT:   Head: Normocephalic and atraumatic.   Neck: Neck supple.   Normal range of motion.  Cardiovascular:  Regular rhythm.   Tachycardia present.   Exam reveals no gallop, no distant heart sounds and no friction rub.       No murmur heard.  Pulmonary/Chest: Effort normal and breath sounds normal. No accessory muscle usage. No tachypnea. No respiratory distress. He has no decreased breath sounds. He has no wheezes. He has no rhonchi. He has no rales.   Abdominal: He exhibits no distension.   Musculoskeletal:      Cervical back: Normal range of motion and neck supple.      Comments: 2+ pitting edema up to the mid shin.      Neurological: He is alert.   Skin: No rash noted.         ED Course   Procedures  Labs Reviewed - No data to display         Imaging Results    None          Medications - No data to display  Medical Decision Making  53-year-old male with a recent hospitalization and diagnosis of CHF with significantly reduced EF presents to the ED with leg swelling and medication issues.  He is mildly tachycardic to 103 at rest, O2 sats 99% on room air.  Patient appears in no acute distress.   Considered repeating labs, however patient had recent full workup and hospitalization.  He denies chest pain or shortness of breath.  His O2 saturations are within normal limits.  Heart rate did improve.  He has not had Lasix since discharge.  My attending and I ultimately felt that patient could be discharged in stable condition.  I have placed referrals to heart failure transitional care clinic as well as cardiology referral at [facility] for further management.  Patient was given a paper prescription for Lasix.  Encouraged low-sodium diet as well as medication  compliance.  Strict ED return precautions were discussed with the patient.  Patient voiced understanding and is comfortable with discharge at this time.  I have reviewed the patient's records and discussed this case with my supervising physician.     Risk  Prescription drug management.                               Clinical Impression:   Final diagnoses:  [R60.0] Bilateral leg edema (Primary)  [I50.9] Congestive heart failure, unspecified HF chronicity, unspecified heart failure type        ED Disposition Condition    Discharge Stable          ED Prescriptions       Medication Sig Dispense Start Date End Date Auth. Provider    furosemide (LASIX) 20 MG tablet Take 1 tablet (20 mg total) by mouth once daily. 30 tablet 9/10/2023 -- Kena Cueva PA-C          Follow-up Information       Follow up With Specialties Details Why Contact Info Additional Information    [facility] - Structural Cardiology, Cardiovascular Disease, Interventional Cardiology, Advanced Heart Failure & Transplant Cardiology   [address] [address]  [phone]       [id number]   [facility] [facility] [id number] [phone] Cardiology Services Clinics - Main Building, [address]rium 3rd Floor  Please park in [facility] and use Atrium elevator             Kena Cueva PA-C  09/11/23 2877

## 2023-09-14 ENCOUNTER — TELEPHONE (OUTPATIENT)
Dept: CARDIOLOGY | Facility: CLINIC | Age: 53
End: 2023-09-14
Payer: MEDICAID

## 2023-09-14 NOTE — TELEPHONE ENCOUNTER
[facility]    Attempted to call pt to complete Phone enrollment to program. Unable to reach pt at listed phone numbers.  Was able to leave message with family encouraging pt to return call with [facility] phone number and reminded of upcoming appt .  RN spoke with pt's mother Kayleen.      Will continue to try to reach patient.

## 2023-09-18 ENCOUNTER — TELEPHONE (OUTPATIENT)
Dept: CARDIOLOGY | Facility: CLINIC | Age: 53
End: 2023-09-18
Payer: MEDICAID

## 2023-09-18 NOTE — TELEPHONE ENCOUNTER
EKG was normal  Okay to schedule [facility]    Attempted to call pt to complete Phone enrollment to program. Unable to reach pt at listed phone numbers.  Was able to leave message with family encouraging pt to return call with [facility] phone number and reminded of upcoming appt .     Will continue to try to reach patient.

## 2023-09-19 ENCOUNTER — TELEPHONE (OUTPATIENT)
Dept: CARDIOLOGY | Facility: CLINIC | Age: 53
End: 2023-09-19
Payer: MEDICAID

## 2023-09-19 NOTE — TELEPHONE ENCOUNTER
[facility] Failure [facility]itional Care Clinic    Attempted to call pt to complete Phone enrollment to program. Unable to reach pt at listed phone numbers.  Was able to leave message on voicemail encouraging pt to return call with [facility] phone number. RN spoke with pt's mother Ms. Beyer. RN has left messages with Ms. Beyer 3 x with no return call. RN gave information about calling clinic to enroll within 30 days of his discharge date.  Pt's mother verbalized understanding. RN will close out referral request as unable to contact pt.

## 2023-09-28 ENCOUNTER — TELEPHONE (OUTPATIENT)
Dept: CARDIOLOGY | Facility: CLINIC | Age: 53
End: 2023-09-28
Payer: MEDICAID

## 2023-09-28 NOTE — TELEPHONE ENCOUNTER
Spoke with patient.  Appointment scheduled.      ----- Message from Tere Finley sent at 9/28/2023 12:27 PM CDT -----  Regarding: Appt  Contact: Pt [phone]  Pt is calling to schedule a appt with provider, pt has a pending  referral please call

## 2023-10-04 PROBLEM — I10 PRIMARY HYPERTENSION: Chronic | Status: ACTIVE | Noted: 2023-10-04

## 2023-10-04 PROBLEM — L03.113 CELLULITIS OF RIGHT UPPER EXTREMITY: Status: RESOLVED | Noted: 2022-08-31 | Resolved: 2023-10-04

## 2023-10-04 PROBLEM — M79.643 PAIN, HAND: Status: RESOLVED | Noted: 2022-08-31 | Resolved: 2023-10-04

## 2024-08-27 ENCOUNTER — TELEPHONE (OUTPATIENT)
Dept: GASTROENTEROLOGY | Facility: CLINIC | Age: 54
End: 2024-08-27
Payer: MEDICAID

## 2024-08-27 DIAGNOSIS — Z12.11 ENCOUNTER FOR SCREENING COLONOSCOPY FOR NON-HIGH-RISK PATIENT: Primary | ICD-10-CM

## 2024-08-27 NOTE — TELEPHONE ENCOUNTER
-Patient's mobile number is not correct in the patient's chart. I called the other number in his chart and left a message fo them to have him call me back.        ---- Message from Gia Menjivar sent at 8/23/2024 10:36 AM CDT -----  Regarding: colonoscopy  Dr KALEIGH Salter     Dx colonoscopy     Plz ctc pt to schedule    Referral/records in epic.     [facility]   Gia Menjivar   [facility]

## 2024-09-19 ENCOUNTER — PATIENT MESSAGE (OUTPATIENT)
Dept: PRIMARY CARE CLINIC | Facility: CLINIC | Age: 54
End: 2024-09-19
Payer: MEDICAID